# Patient Record
Sex: FEMALE | Race: WHITE | Employment: OTHER | URBAN - METROPOLITAN AREA
[De-identification: names, ages, dates, MRNs, and addresses within clinical notes are randomized per-mention and may not be internally consistent; named-entity substitution may affect disease eponyms.]

---

## 2017-04-10 PROCEDURE — 88175 CYTOPATH C/V AUTO FLUID REDO: CPT | Performed by: OBSTETRICS & GYNECOLOGY

## 2017-06-29 PROCEDURE — 81003 URINALYSIS AUTO W/O SCOPE: CPT | Performed by: INTERNAL MEDICINE

## 2018-09-15 ENCOUNTER — LAB ENCOUNTER (OUTPATIENT)
Dept: LAB | Age: 68
End: 2018-09-15
Attending: INTERNAL MEDICINE
Payer: COMMERCIAL

## 2018-09-15 DIAGNOSIS — I10 ESSENTIAL HYPERTENSION: Primary | ICD-10-CM

## 2018-09-15 LAB — TSI SER-ACNC: 1.03 MIU/ML (ref 0.35–5.5)

## 2018-09-15 PROCEDURE — 84443 ASSAY THYROID STIM HORMONE: CPT

## 2018-09-19 PROCEDURE — 87086 URINE CULTURE/COLONY COUNT: CPT | Performed by: INTERNAL MEDICINE

## 2018-09-19 PROCEDURE — 87077 CULTURE AEROBIC IDENTIFY: CPT | Performed by: INTERNAL MEDICINE

## 2018-09-19 PROCEDURE — 81001 URINALYSIS AUTO W/SCOPE: CPT | Performed by: INTERNAL MEDICINE

## 2021-05-05 PROBLEM — I65.23 CAROTID STENOSIS, ASYMPTOMATIC, BILATERAL: Status: ACTIVE | Noted: 2021-05-05

## 2021-05-18 PROCEDURE — 88305 TISSUE EXAM BY PATHOLOGIST: CPT | Performed by: INTERNAL MEDICINE

## 2025-03-16 ENCOUNTER — HOSPITAL ENCOUNTER (EMERGENCY)
Facility: HOSPITAL | Age: 75
Discharge: HOME | End: 2025-03-17
Attending: EMERGENCY MEDICINE
Payer: MEDICARE

## 2025-03-16 DIAGNOSIS — R21 RASH: Primary | ICD-10-CM

## 2025-03-16 LAB
BASOPHILS # BLD AUTO: 0.03 X10(3) UL (ref 0–0.2)
BASOPHILS NFR BLD AUTO: 0.4 %
EOSINOPHIL # BLD AUTO: 0.24 X10(3) UL (ref 0–0.7)
EOSINOPHIL NFR BLD AUTO: 3.4 %
ERYTHROCYTE [DISTWIDTH] IN BLOOD BY AUTOMATED COUNT: 12.6 %
HCT VFR BLD AUTO: 38.9 %
HGB BLD-MCNC: 13.4 G/DL
IMM GRANULOCYTES # BLD AUTO: 0.02 X10(3) UL (ref 0–1)
IMM GRANULOCYTES NFR BLD: 0.3 %
LYMPHOCYTES # BLD AUTO: 2.54 X10(3) UL (ref 1–4)
LYMPHOCYTES NFR BLD AUTO: 35.8 %
MCH RBC QN AUTO: 30.5 PG (ref 26–34)
MCHC RBC AUTO-ENTMCNC: 34.4 G/DL (ref 31–37)
MCV RBC AUTO: 88.4 FL
MONOCYTES # BLD AUTO: 0.66 X10(3) UL (ref 0.1–1)
MONOCYTES NFR BLD AUTO: 9.3 %
NEUTROPHILS # BLD AUTO: 3.61 X10 (3) UL (ref 1.5–7.7)
NEUTROPHILS # BLD AUTO: 3.61 X10(3) UL (ref 1.5–7.7)
NEUTROPHILS NFR BLD AUTO: 50.8 %
PLATELET # BLD AUTO: 211 10(3)UL (ref 150–450)
RBC # BLD AUTO: 4.4 X10(6)UL
WBC # BLD AUTO: 7.1 X10(3) UL (ref 4–11)

## 2025-03-16 PROCEDURE — 96374 THER/PROPH/DIAG INJ IV PUSH: CPT

## 2025-03-16 PROCEDURE — 99284 EMERGENCY DEPT VISIT MOD MDM: CPT

## 2025-03-16 PROCEDURE — 85025 COMPLETE CBC W/AUTO DIFF WBC: CPT | Performed by: EMERGENCY MEDICINE

## 2025-03-16 PROCEDURE — 80053 COMPREHEN METABOLIC PANEL: CPT | Performed by: EMERGENCY MEDICINE

## 2025-03-17 VITALS
WEIGHT: 135 LBS | RESPIRATION RATE: 18 BRPM | DIASTOLIC BLOOD PRESSURE: 76 MMHG | SYSTOLIC BLOOD PRESSURE: 172 MMHG | HEART RATE: 57 BPM | BODY MASS INDEX: 22 KG/M2 | OXYGEN SATURATION: 98 % | TEMPERATURE: 97 F

## 2025-03-17 LAB
ALBUMIN SERPL-MCNC: 4.7 G/DL (ref 3.2–4.8)
ALBUMIN/GLOB SERPL: 1.8 {RATIO} (ref 1–2)
ALP LIVER SERPL-CCNC: 110 U/L
ALT SERPL-CCNC: 104 U/L
ANION GAP SERPL CALC-SCNC: 10 MMOL/L (ref 0–18)
AST SERPL-CCNC: 56 U/L (ref ?–34)
BILIRUB SERPL-MCNC: 0.4 MG/DL (ref 0.2–1.1)
BUN BLD-MCNC: 18 MG/DL (ref 9–23)
CALCIUM BLD-MCNC: 9.6 MG/DL (ref 8.7–10.6)
CHLORIDE SERPL-SCNC: 101 MMOL/L (ref 98–112)
CO2 SERPL-SCNC: 30 MMOL/L (ref 21–32)
CREAT BLD-MCNC: 0.82 MG/DL
EGFRCR SERPLBLD CKD-EPI 2021: 75 ML/MIN/1.73M2 (ref 60–?)
GLOBULIN PLAS-MCNC: 2.6 G/DL (ref 2–3.5)
GLUCOSE BLD-MCNC: 103 MG/DL (ref 70–99)
OSMOLALITY SERPL CALC.SUM OF ELEC: 294 MOSM/KG (ref 275–295)
POTASSIUM SERPL-SCNC: 3.9 MMOL/L (ref 3.5–5.1)
PROT SERPL-MCNC: 7.3 G/DL (ref 5.7–8.2)
SODIUM SERPL-SCNC: 141 MMOL/L (ref 136–145)

## 2025-03-17 RX ORDER — CEPHALEXIN 500 MG/1
500 CAPSULE ORAL 4 TIMES DAILY
Qty: 40 CAPSULE | Refills: 0 | Status: SHIPPED | OUTPATIENT
Start: 2025-03-17 | End: 2025-03-27

## 2025-03-17 NOTE — ED INITIAL ASSESSMENT (HPI)
Pt arrives to ed w c/o poss clot in her right arm. Pt reports some redness/swelling to right bicep.denies blood thinner use. Denies CP or sob. Denies hx of blood clots.

## 2025-06-02 ENCOUNTER — HOSPITAL ENCOUNTER (EMERGENCY)
Facility: HOSPITAL | Age: 75
Discharge: HOME | End: 2025-06-02
Attending: EMERGENCY MEDICINE
Payer: MEDICARE

## 2025-06-02 ENCOUNTER — APPOINTMENT (OUTPATIENT)
Dept: GENERAL RADIOLOGY | Facility: HOSPITAL | Age: 75
End: 2025-06-02
Payer: MEDICARE

## 2025-06-02 VITALS
TEMPERATURE: 98 F | SYSTOLIC BLOOD PRESSURE: 167 MMHG | BODY MASS INDEX: 22 KG/M2 | DIASTOLIC BLOOD PRESSURE: 75 MMHG | HEART RATE: 67 BPM | OXYGEN SATURATION: 98 % | RESPIRATION RATE: 18 BRPM | WEIGHT: 138 LBS

## 2025-06-02 DIAGNOSIS — M23.92 INTERNAL DERANGEMENT OF LEFT KNEE: Primary | ICD-10-CM

## 2025-06-02 PROCEDURE — 99284 EMERGENCY DEPT VISIT MOD MDM: CPT

## 2025-06-02 PROCEDURE — 73562 X-RAY EXAM OF KNEE 3: CPT

## 2025-06-02 PROCEDURE — 99283 EMERGENCY DEPT VISIT LOW MDM: CPT

## 2025-06-02 RX ORDER — IBUPROFEN 600 MG/1
600 TABLET, FILM COATED ORAL ONCE
Status: COMPLETED | OUTPATIENT
Start: 2025-06-02 | End: 2025-06-02

## 2025-06-03 NOTE — ED INITIAL ASSESSMENT (HPI)
74YF c/c of L knee pain Pt state that she was stepping off the curb and felt pain in her L knee that radiates to her hip pt denies falling or injury

## 2025-06-03 NOTE — ED PROVIDER NOTES
Patient Seen in: [facility] Emergency Department        History  Chief Complaint   Patient presents with    Leg Pain     Stated Complaint: arthritis in knees, just stepped off the curb and severe pain now in left knee *    Subjective:   HPI            74-year-old female presenting with left knee injury.  Patient states she was walking when she felt a pop in her left knee and is having pain in it since that time as well as swelling.  She denies any instability she denies radiation of her pain she denies any other exacerbating relieving factors or associated symptoms.      Objective:     Past Medical History:    Arthritis    Carotid artery disease    < 50% bilat, recheck 2 years, treat risk factors ( overdue due to covid), stable 6/10/21, recheck 2024    Dislocation of sternoclavicular joint, closed    Dr. Carter, conservative tx    FHx: AAA    family hx AAA mother and father, both smoker, advised 1 time screening 1/21/16, she will get with [employer] 2018    Left wrist tendonitis    resolved 1/21/16    Lipoma    back, Dr. Kimberly Andrews is derm 5/5/21    Living will in place    Nonspecific elevation of levels of transaminase or lactic acid dehydrogenase (LDH)    mild, w/u with Dr. Masterson was neg    Normal tension glaucoma of right eye    Dr. Arina Marino at [facility], early stage, told not to have too low of a diastolic bp    Other and unspecified hyperlipidemia    Precancerous skin lesion    Dr. Andrews is new derm, sees 4/20    Shingles    treated by derm, R abd and back    Stress fracture foot    Unspecified essential hypertension    Unspecified hypothyroidism    Vitamin D deficiency              Past Surgical History:   Procedure Laterality Date    Appendectomy  1971    When ovarian surgery done    Colonoscopy  05/2021    3mm recta polyp hyperplastic, diverticulosis, int hem, rpt 2031    Flex sig  3/01    Special service or report  1971    benign ovarian tumor removed    Special service or report  1984     lipoma removed left shoulder                Social History     Socioeconomic History    Marital status:     Number of children: 2   Occupational History    Occupation: retired asst  [employer]   Tobacco Use    Smoking status: Never    Smokeless tobacco: Never   Vaping Use    Vaping status: Never Used   Substance and Sexual Activity    Alcohol use: Yes     Alcohol/week: 2.0 - 3.0 standard drinks of alcohol     Types: 2 - 3 Glasses of wine per week    Drug use: No    Sexual activity: Yes     Partners: Male   Other Topics Concern     Service No    Blood Transfusions No    Exercise Yes     Comment: knees impeding her 5/5/21,     Seat Belt Yes    Self-Exams No     Comment: discussed.  reminded again 2009, 10, 6/12/13, 12/11/14, 1/21/16, 4/26/17, 9/19/18, 3/28/20, 5/5/21                                Physical Exam    ED Triage Vitals [06/02/25 1957]   /68   Pulse 72   Resp 20   Temp 98.4 °F (36.9 °C)   Temp src Temporal   SpO2 98 %   O2 Device None (Room air)       Current Vitals:   Vital Signs  BP: (!) 167/75  Pulse: 67  Resp: 18  Temp: 98.4 °F (36.9 °C)  Temp src: Temporal    Oxygen Therapy  SpO2: 98 %  O2 Device: None (Room air)            Physical Exam     Awake alert patient appears in no distress HEENT exam normal lungs normal cardiovascular exam normal abdomen normal extremities no COVID cyanosis or edema left knee effusion present no patellar apprehension no laxity in anterior posterior drawer test or medial lateral collateral ligament stressors neurovascular tact distal area of injury no breaks in the skin      ED Course  Labs Reviewed - No data to display    ED Course as of 06/02/25 2312  ------------------------------------------------------------  Time: 06/02 2256  Value: XR KNEE (3 VIEWS), LEFT (CPT=73562)  Comment: Independent interpretation by ED physician of x-ray shows no acute     Differential diagnosis includes open fracture, fracture                  MDM             Medical  Decision Making  74-year-old female with left knee pain independent interpretation by ED physician of x-ray showed no acute fracture she was placed in knee immobilizer started anti-inflammatory medication was instructed follow-up with orthopedics and is to return to the emergency department for worsening symptoms other complaints  The patient was screened and evaluated during this visit.  As a treating physician attending to the patient, I determined, within reasonable clinical confidence and prior to discharge, that an emergency medical condition was not or was no longer present.  There was no indication for further evaluation, treatment or admission on an emergency basis.    The usual and customary discharge instructions were discussed given the patient's ER course.  We discussed signs and symptoms that should prompt the patient's immediate return to the emergency department.  Reasonable over-the-counter and prescription treatment options and physician follow-up plan was discussed.  Patient was discharged home in good condition  This note was prepared using Dragon Medical voice recognition dictation software.  As a result errors may occur.  When identified to these areas have been corrected.  While every attempt is made to correct errors during dictation discrepancies may still exist.  Please contact if there are any errors    Problems Addressed:  Internal derangement of left knee: acute illness or injury    Amount and/or Complexity of Data Reviewed  Radiology: ordered and independent interpretation performed. Decision-making details documented in ED Course.  ECG/medicine tests: ordered and independent interpretation performed. Decision-making details documented in ED Course.        Disposition and Plan     Clinical Impression:  1. Internal derangement of left knee         Disposition:  Discharge  6/2/2025 11:12 pm    Follow-up:  Fidencio Lea,   [address]  [id number]  [phone]    Follow up in 1 week(s)            Medications Prescribed:  Current Discharge Medication List                Supplementary Documentation:

## 2025-06-03 NOTE — ED QUICK NOTES
Patient updated on rooming process  Re-vital'd, denies any new or worsening sx since initial triage assessment  Offered tylenol or ibuprofen for pain relief, medicated per MAR  RR even/NL

## (undated) NOTE — LETTER
September 17, 2018          [address] [place] [id number]          Dear Keyona Jeffery: The following are the results of your recent tests. Please review the list of test results.   Your result is the value on the left; we hav